# Patient Record
Sex: MALE | Race: WHITE | URBAN - METROPOLITAN AREA
[De-identification: names, ages, dates, MRNs, and addresses within clinical notes are randomized per-mention and may not be internally consistent; named-entity substitution may affect disease eponyms.]

---

## 2017-11-05 ENCOUNTER — HOSPITAL ENCOUNTER (EMERGENCY)
Age: 22
Discharge: LEFT BEFORE DISCHARGE | End: 2017-11-05
Attending: EMERGENCY MEDICINE
Payer: SELF-PAY

## 2017-11-05 VITALS — BODY MASS INDEX: 24.22 KG/M2 | HEIGHT: 72 IN | WEIGHT: 178.79 LBS

## 2017-11-05 PROCEDURE — 75810000275 HC EMERGENCY DEPT VISIT NO LEVEL OF CARE
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
